# Patient Record
Sex: FEMALE | Race: WHITE | HISPANIC OR LATINO | URBAN - METROPOLITAN AREA
[De-identification: names, ages, dates, MRNs, and addresses within clinical notes are randomized per-mention and may not be internally consistent; named-entity substitution may affect disease eponyms.]

---

## 2018-12-04 ENCOUNTER — EMERGENCY (EMERGENCY)
Facility: HOSPITAL | Age: 18
LOS: 1 days | Discharge: HOME | End: 2018-12-04
Attending: EMERGENCY MEDICINE

## 2018-12-04 VITALS
SYSTOLIC BLOOD PRESSURE: 120 MMHG | OXYGEN SATURATION: 100 % | HEART RATE: 85 BPM | TEMPERATURE: 98 F | RESPIRATION RATE: 20 BRPM | DIASTOLIC BLOOD PRESSURE: 66 MMHG

## 2018-12-04 DIAGNOSIS — N89.8 OTHER SPECIFIED NONINFLAMMATORY DISORDERS OF VAGINA: ICD-10-CM

## 2018-12-04 DIAGNOSIS — R10.32 LEFT LOWER QUADRANT PAIN: ICD-10-CM

## 2018-12-04 DIAGNOSIS — Z3A.01 LESS THAN 8 WEEKS GESTATION OF PREGNANCY: ICD-10-CM

## 2018-12-04 DIAGNOSIS — R11.0 NAUSEA: ICD-10-CM

## 2018-12-04 DIAGNOSIS — O26.891 OTHER SPECIFIED PREGNANCY RELATED CONDITIONS, FIRST TRIMESTER: ICD-10-CM

## 2018-12-04 DIAGNOSIS — R10.2 PELVIC AND PERINEAL PAIN: ICD-10-CM

## 2018-12-04 LAB
ALBUMIN SERPL ELPH-MCNC: 5.3 G/DL — HIGH (ref 3.5–5.2)
ALP SERPL-CCNC: 57 U/L — SIGNIFICANT CHANGE UP (ref 30–115)
ALT FLD-CCNC: 14 U/L — SIGNIFICANT CHANGE UP (ref 14–37)
ANION GAP SERPL CALC-SCNC: 16 MMOL/L — HIGH (ref 7–14)
APPEARANCE UR: ABNORMAL
APTT BLD: 31.7 SEC — SIGNIFICANT CHANGE UP (ref 27–39.2)
AST SERPL-CCNC: 14 U/L — SIGNIFICANT CHANGE UP (ref 14–37)
BACTERIA # UR AUTO: ABNORMAL /HPF
BILIRUB SERPL-MCNC: 0.4 MG/DL — SIGNIFICANT CHANGE UP (ref 0.2–1.2)
BILIRUB UR-MCNC: NEGATIVE — SIGNIFICANT CHANGE UP
BUN SERPL-MCNC: 6 MG/DL — LOW (ref 10–20)
CALCIUM SERPL-MCNC: 10 MG/DL — SIGNIFICANT CHANGE UP (ref 8.5–10.1)
CHLORIDE SERPL-SCNC: 100 MMOL/L — SIGNIFICANT CHANGE UP (ref 98–110)
CO2 SERPL-SCNC: 23 MMOL/L — SIGNIFICANT CHANGE UP (ref 17–32)
COLOR SPEC: YELLOW — SIGNIFICANT CHANGE UP
CREAT SERPL-MCNC: 0.6 MG/DL — LOW (ref 0.7–1.5)
DIFF PNL FLD: NEGATIVE — SIGNIFICANT CHANGE UP
EPI CELLS # UR: ABNORMAL /HPF
GLUCOSE SERPL-MCNC: 86 MG/DL — SIGNIFICANT CHANGE UP (ref 70–99)
GLUCOSE UR QL: NEGATIVE MG/DL — SIGNIFICANT CHANGE UP
HCT VFR BLD CALC: 41.2 % — SIGNIFICANT CHANGE UP (ref 37–47)
HGB BLD-MCNC: 14.3 G/DL — SIGNIFICANT CHANGE UP (ref 12–16)
INR BLD: 1.25 RATIO — SIGNIFICANT CHANGE UP (ref 0.65–1.3)
KETONES UR-MCNC: ABNORMAL
LEUKOCYTE ESTERASE UR-ACNC: ABNORMAL
MCHC RBC-ENTMCNC: 29.9 PG — SIGNIFICANT CHANGE UP (ref 27–31)
MCHC RBC-ENTMCNC: 34.7 G/DL — SIGNIFICANT CHANGE UP (ref 32–37)
MCV RBC AUTO: 86.2 FL — SIGNIFICANT CHANGE UP (ref 81–99)
NITRITE UR-MCNC: NEGATIVE — SIGNIFICANT CHANGE UP
NRBC # BLD: 0 /100 WBCS — SIGNIFICANT CHANGE UP (ref 0–0)
PH UR: 6.5 — SIGNIFICANT CHANGE UP (ref 5–8)
PLATELET # BLD AUTO: 284 K/UL — SIGNIFICANT CHANGE UP (ref 130–400)
POTASSIUM SERPL-MCNC: 3.8 MMOL/L — SIGNIFICANT CHANGE UP (ref 3.5–5)
POTASSIUM SERPL-SCNC: 3.8 MMOL/L — SIGNIFICANT CHANGE UP (ref 3.5–5)
PROT SERPL-MCNC: 7.4 G/DL — SIGNIFICANT CHANGE UP (ref 6–8)
PROT UR-MCNC: ABNORMAL MG/DL
PROTHROM AB SERPL-ACNC: 14.3 SEC — HIGH (ref 9.95–12.87)
RBC # BLD: 4.78 M/UL — SIGNIFICANT CHANGE UP (ref 4.2–5.4)
RBC # FLD: 12.8 % — SIGNIFICANT CHANGE UP (ref 11.5–14.5)
SODIUM SERPL-SCNC: 139 MMOL/L — SIGNIFICANT CHANGE UP (ref 135–146)
SP GR SPEC: 1.02 — SIGNIFICANT CHANGE UP (ref 1.01–1.03)
UROBILINOGEN FLD QL: 0.2 MG/DL — SIGNIFICANT CHANGE UP (ref 0.2–0.2)
WBC # BLD: 11.44 K/UL — HIGH (ref 4.8–10.8)
WBC # FLD AUTO: 11.44 K/UL — HIGH (ref 4.8–10.8)
WBC UR QL: ABNORMAL /HPF

## 2018-12-04 RX ORDER — ONDANSETRON 8 MG/1
4 TABLET, FILM COATED ORAL ONCE
Qty: 0 | Refills: 0 | Status: COMPLETED | OUTPATIENT
Start: 2018-12-04 | End: 2018-12-04

## 2018-12-04 RX ADMIN — ONDANSETRON 4 MILLIGRAM(S): 8 TABLET, FILM COATED ORAL at 18:24

## 2018-12-04 NOTE — ED ADULT NURSE NOTE - OBJECTIVE STATEMENT
Pt had positive pregnancy test at home. unsure how many weeks. Pt presents complaining of pelvic pain

## 2018-12-04 NOTE — ED PROVIDER NOTE - NSFOLLOWUPCLINICS_GEN_ALL_ED_FT
[facility]  OB/GYN  [address] [place] [address]  Phone: [phone]  Fax:   Follow Up Time:

## 2018-12-04 NOTE — ED ADULT TRIAGE NOTE - CHIEF COMPLAINT QUOTE
pelvic pain x 2 weeks, found out she was pregnant on Friday unsure how many weeks / no vaginal bleeding noted

## 2018-12-04 NOTE — ED PROVIDER NOTE - PHYSICAL EXAMINATION
CONSTITUTIONAL: Well-developed; well-nourished; in no acute distress.   SKIN: warm, dry  HEAD: Normocephalic; atraumatic.  EYES: PERRL, EOMI, no conjunctival erythema  ENT: No nasal discharge; airway clear.  NECK: Supple; non tender.  CARD: S1, S2 normal; no murmurs, gallops, or rubs. Regular rate and rhythm.   RESP: No wheezes, rales or rhonchi.  ABD: soft ntnd  Gyn: White liquid discharge, no purulence, cervical Os closed, no Cervical motion tenderness  EXT: Normal ROM.  No clubbing, cyanosis or edema.   LYMPH: No acute cervical adenopathy.  NEURO: Alert, oriented, grossly unremarkable  PSYCH: Cooperative, appropriate.

## 2018-12-04 NOTE — ED PROVIDER NOTE - OBJECTIVE STATEMENT
Pt is a 18 y.o. F with no PMH  presents with 2 weeks of abdominal pain, vaginal discharge and dysparenia. Her LMP was the , she had a pregnancy test two weeks ago that was positive. She had episodes of nausea without vomiting, no diarrhea, no fever or pelvic bleeding. She denies recent trauma. Her last sexual activity was last week with her  who was her only partner.

## 2018-12-04 NOTE — ED PROVIDER NOTE - MEDICAL DECISION MAKING DETAILS
I personally evaluated the patient. I reviewed the Resident’s note (as assigned above), and agree with the findings and plan except as documented in my note.   17 y/o F with no significant PMHx , came in c/o x2 weeks of abdominal pain and nausea. Pt had (+) upreg results x1 week ago. No vomiting. Pt has whitish, mucousy vaginal discharge. No bleeding or spotting. No f/c. Pts  was tested for GC last year. Pts last sexual activity was 1 week ago. Pt has only 1 sexual partner- her . Pt c/o dyspareunia. Pt is also c/o LLQ pain and suprapubic pain, no guarding or tenderness. Pt has pulling of whitish secretion. No cervical motion tenderness. PE: Gen - NAD. Head - NCAT. Pharynx - clear. MMM. Heart - RRR, no m/g/r. Lungs - CTAB, no w/c/r. Abdomen- (+) suprapubic and LLQ tenderness, soft, ND, normal bowel sounds. Plan: Labs, IV, urine and US pelvis. I personally evaluated the patient. I reviewed the Resident’s note (as assigned above), and agree with the findings and plan except as documented in my note.   17 y/o F with no significant PMHx , came in c/o x2 weeks of abdominal pain and nausea. Pt had (+) upreg results x1 week ago. No vomiting. Pt has whitish, mucousy vaginal discharge. No bleeding or spotting. No f/c. Pts  was tested for GC last year. Pts last sexual activity was 1 week ago. Pt has only 1 sexual partner- her . Pt c/o dyspareunia. Pt is also c/o LLQ pain and suprapubic pain, no guarding or tenderness. Pt has pulling of whitish secretion. No cervical motion tenderness. PE: Gen - NAD. Head - NCAT. Pharynx - clear. MMM. Heart - RRR, no m/g/r. Lungs - CTAB, no w/c/r. Abdomen- (+) suprapubic and LLQ tenderness, soft, ND, normal bowel sounds. Plan: Labs, IV, urine and US pelvis. Pt disclosed, very anxious. Pt was consolable. Plan: d/c home with Psych f/u out pt as well as cardio out pt. I personally evaluated the patient. I reviewed the Resident’s note (as assigned above), and agree with the findings and plan except as documented in my note.   19 y/o F with no significant PMHx , came in c/o x2 weeks of abdominal pain and nausea. Pt had (+) upreg results x1 week ago. No vomiting. Pt has whitish, mucousy vaginal discharge. No bleeding or spotting. No f/c. Pts  was tested for GC last year. Pts last sexual activity was 1 week ago. Pt has only 1 sexual partner- her . Pt c/o dyspareunia. Pt is also c/o LLQ pain and suprapubic pain, no guarding or tenderness. Pt has pooling of whitish secretion. No cervical motion tenderness. PE: Gen - NAD. Head - NCAT. Pharynx - clear. MMM. Heart - RRR, no m/g/r. Lungs - CTAB, no w/c/r. Abdomen- (+) suprapubic and LLQ tenderness, soft, ND, normal bowel sounds. Plan: Labs, IV, urine and US pelvis. Dx - early pregnancy, pain improved, D/Ab home with outpatient OB/GYN f/u. Advised to return for worsening pain, vaginal bleeding or other concerns.

## 2021-01-18 NOTE — ED PROVIDER NOTE - NS ED ROS FT
Patient Education   Please call Radha Chopra to schedule the following:  Holter Monitor  Exercise Stress Test  Echocardiogram    Radha Chopra [phone]    What Is Holter Monitoring?  Holter monitoring is a painless way to record your heartbeat away from your healthcare provider’s office. It's a small, battery-operated electrocardiogram (ECG) machine. It has wires that are put on your chest and a device to carry with you. It's about the size of a small digital camera. Holter monitoring records your heartbeat for at least 24 hours. Your provider then reviews the information at a later time. You can get your Holter monitor at a hospital, test center, or provider’s office.  The monitor is used to record any problems with your heartbeat. It can show racing heartbeats, slow heartbeats, skipping heartbeats, fluttering heartbeats (palpitations), and fainting. All of these symptoms may be caused by an irregular heartbeat (arrhythmia). Holter monitors also provide information about your average heart rate throughout the day.  Your Holter monitor  When you get a Holter monitor, your healthcare provider puts small, sticky pads (electrodes) on your chest. These connect to the monitor. The monitor attaches to a belt or shoulder strap. You need to keep the device on for at least 24 to 48 hours. You also need to complete a diary. Your provider will tell you how to do so. While wearing the monitor, follow these tips:  · Try to sleep on your back.  · Don’t take a shower. A sponge bath is OK.  · Follow your normal routine. Go to work and exercise as usual. It's important to record your heartbeat during your normal activities.  · If an electrode falls off or the unit makes noise, call your healthcare provider. Or call the phone number for device support to see what you should do.  · When the monitoring period is over, you will return to your provider's office or an electrocardiogram center. Sometimes your monitor may be returned  by mail. Make sure you know where to return your device. Once your provider receives the information from your monitor, it will be reviewed.  · It may take a week or so for the information to be looked at. Ask your provider if you should have a follow-up appointment or how you will get the results from your test.     When using a Holter monitor  Stay away from electric blankets, magnets, metal detectors, and high-voltage areas such as power lines. They may affect the recording. Also don’t use machines with large motors or that cause vibrations. These also can affect the recording.  Keeping a Holter monitor diary  Keep a diary of things you do during the day. This is important. It could help you match your symptoms to times when your heart is beating abnormally.  · Write down when you take medicines, drive to work, take a nap, or feel stress.  · Record the time of day for each entry you make.  · Note any changes in activity, including when you take medicines.  · Note any symptoms you feel. These might be fluttering heartbeats or skipped beats.    When to call your healthcare provider  Call your provider right away if any of these occur:  · Dizziness or lightheadedness  · Chest pain  · Shortness of breath  · Strong fluttering heartbeats  [employer] last reviewed this educational content on 5/1/2019  © 1536-4209 [employer]. [address] [place], [place] [address]. All rights reserved. This information is not intended as a substitute for professional medical care. Always follow your healthcare professional's instructions.         Patient Education     Exercise Stress Test   An exercise stress test shows your heart's response to exercise. Your healthcare provider often gives you this test to evaluate the blood flow to your heart, your exercise tolerance, your heart's pumping pattern at different levels of work, or the presence of a heart rhythm disturbance. The test records your heartbeat while you walk on  a treadmill or ride a stationary bike. The test can be done in a hospital, a test center, or your healthcare provider's office. The test is also called a stress electrocardiogram (ECG). This test is only appropriate in those who have the ability to participate in exercise and have no contraindications such as risk of falls.     Before your test  Tips to being prepared before your test include the following:   · Be sure to mention the prescription and over-the-counter medicines you take. Ask if it’s OK to take them before the test. Include any supplements or herbal medicines.  · Follow any instructions from your provider about not eating or drinking before the test.   · Don’t smoke or have any caffeine for 3 hours before the test, or as directed by your healthcare provider.  · Don't exercise right before the test.   Getting ready  Tips can help you get ready for your test:   · Wear comfortable walking or running shoes.  · Wear a shirt that you can remove easily. You may be asked to remove your clothing from the waist up. Women may wear a gown.  During your test  Here is what to expect at the time of the test:   · Staff will put small pads (electrodes) on your upper body and a blood pressure cuff on your arm. These are used to monitor your heartbeat and blood pressure during and after the test.  · You are shown how to use the treadmill or bike.  · You are then asked to exercise for several minutes. Expect the exercise to be easy at first. It will slowly get harder, with an increase in speed and incline every few minutes. This happens to monitor your heart as it's forced to work harder.   · Exercise as long as you can, or until you are asked to stop.  · You will be asked to stop if you have certain symptoms. Another test to evaluate your heart may then be ordered.  Be sure to tell your healthcare provider if you feel any of the following:  · Chest, arm, or jaw pain or discomfort  · Severe shortness of  breath  · Fatigue  · Dizziness  · Leg cramps or soreness  · Faintness  · Palpitations  After the test  Here is what to expect after your test:   · You can resume your normal activity, unless otherwise instructed.   · The results are sent to your healthcare provider.  · Be sure to keep your follow-up appointment to learn the results of this test and what they mean.  Report any symptoms  Be sure to tell your healthcare provider if you feel:  · Chest, arm, or jaw discomfort  · Severe shortness of breath  · Fatigue  · Dizziness  · Leg cramps or soreness  · Faintness  · Palpitations  [employer] last reviewed this educational content on 11/1/2019  © 2902-1651 [employer]. [address] [place] [address], [place] [address]. All rights reserved. This information is not intended as a substitute for professional medical care. Always follow your healthcare professional's instructions.            General: No fever, No chills  Eyes:  No visual changes, eye pain or discharge.  ENMT:  No hearing changes, pain, no sore throat or runny nose, no difficulty swallowing  Cardiac:  No chest pain, SOB or edema. No chest pain with exertion.  Respiratory:  No cough or respiratory distress. No hemoptysis. No history of asthma or RAD.  GI:  +abdominal pain, No nausea, vomiting, diarrhea  :  +Dysparenia, +dyscharge, No dysuria, frequency or burning.  MS:  No myalgia, muscle weakness, joint pain or back pain.  Neuro:  No headache or weakness.  No LOC.  Skin:  No skin rash.   Endocrine: No history of thyroid disease or diabetes.

## 2025-03-15 ENCOUNTER — NON-APPOINTMENT (OUTPATIENT)
Age: 25
End: 2025-03-15

## 2025-04-18 ENCOUNTER — NON-APPOINTMENT (OUTPATIENT)
Age: 25
End: 2025-04-18